# Patient Record
Sex: MALE | Race: BLACK OR AFRICAN AMERICAN | NOT HISPANIC OR LATINO | URBAN - METROPOLITAN AREA
[De-identification: names, ages, dates, MRNs, and addresses within clinical notes are randomized per-mention and may not be internally consistent; named-entity substitution may affect disease eponyms.]

---

## 2020-05-27 DIAGNOSIS — Z85.46 HISTORY OF PROSTATE CANCER: Primary | ICD-10-CM

## 2020-06-01 ENCOUNTER — OFFICE VISIT (OUTPATIENT)
Dept: UROLOGY | Facility: CLINIC | Age: 60
End: 2020-06-01
Payer: COMMERCIAL

## 2020-06-01 VITALS
HEART RATE: 82 BPM | HEIGHT: 70 IN | RESPIRATION RATE: 18 BRPM | SYSTOLIC BLOOD PRESSURE: 124 MMHG | BODY MASS INDEX: 26.48 KG/M2 | DIASTOLIC BLOOD PRESSURE: 78 MMHG | WEIGHT: 185 LBS

## 2020-06-01 DIAGNOSIS — N39.3 STRESS INCONTINENCE, MALE: ICD-10-CM

## 2020-06-01 DIAGNOSIS — N52.31 ERECTILE DYSFUNCTION AFTER RADICAL PROSTATECTOMY: Primary | ICD-10-CM

## 2020-06-01 DIAGNOSIS — C61 PROSTATE CANCER: ICD-10-CM

## 2020-06-01 DIAGNOSIS — Z85.46 HISTORY OF PROSTATE CANCER: ICD-10-CM

## 2020-06-01 LAB — PSA, DIAGNOSTIC: <0.014 NG/ML (ref 0–4)

## 2020-06-01 PROCEDURE — 99204 PR OFFICE/OUTPT VISIT, NEW, LEVL IV, 45-59 MIN: ICD-10-PCS | Mod: S$GLB,,, | Performed by: SPECIALIST

## 2020-06-01 PROCEDURE — 3008F PR BODY MASS INDEX (BMI) DOCUMENTED: ICD-10-PCS | Mod: CPTII,S$GLB,, | Performed by: SPECIALIST

## 2020-06-01 PROCEDURE — 3008F BODY MASS INDEX DOCD: CPT | Mod: CPTII,S$GLB,, | Performed by: SPECIALIST

## 2020-06-01 PROCEDURE — 99204 OFFICE O/P NEW MOD 45 MIN: CPT | Mod: S$GLB,,, | Performed by: SPECIALIST

## 2020-06-01 RX ORDER — BENAZEPRIL HYDROCHLORIDE 40 MG/1
TABLET ORAL
COMMUNITY
Start: 2020-04-15

## 2020-06-01 RX ORDER — ATORVASTATIN CALCIUM 40 MG/1
TABLET, FILM COATED ORAL
COMMUNITY
Start: 2020-03-03

## 2020-06-01 RX ORDER — ASPIRIN 81 MG/1
81 TABLET ORAL DAILY
COMMUNITY

## 2020-06-01 RX ORDER — AMOXICILLIN 500 MG
CAPSULE ORAL DAILY
COMMUNITY

## 2020-06-01 RX ORDER — PANTOPRAZOLE SODIUM 40 MG/1
TABLET, DELAYED RELEASE ORAL
COMMUNITY
Start: 2020-05-01

## 2020-06-01 RX ORDER — DULAGLUTIDE 1.5 MG/.5ML
INJECTION, SOLUTION SUBCUTANEOUS
COMMUNITY
Start: 2020-05-24

## 2020-06-01 RX ORDER — DAPAGLIFLOZIN AND METFORMIN HYDROCHLORIDE 5; 1000 MG/1; MG/1
TABLET, FILM COATED, EXTENDED RELEASE ORAL
COMMUNITY
Start: 2020-04-15

## 2020-06-01 NOTE — PROGRESS NOTES
Subjective:       Patient ID: Gabriele Orr is a 60 y.o. male.    Chief Complaint: Other (New pt. referred by Dr. Archer to eval and treat; hx of prostate cancer)      HPI:  60-year-old man who was presented to me for management of prostate cancer.  He was diagnosed with prostate cancer in [facility] and treated with a open radical retropubic prostatectomy performed by Dr. Carlito Weston at [facility] in [facility].  Patient is now moved back to [facility] and he is presenting today to establish urological care.    He reports stress incontinence which is worse with exertion coughing and sneezing.  He is down now to about 2 pads per day.  That is at least manageable to him.    He reports postprostatectomy erectile dysfunction.  Before he left [place] he also on intracavernosal penile injections.  He was using about 40 units (0.4 cc) and that seemed to work for him.  He reports that it will give him an erection for about 3-4 hours.  Prior to leaving [place] he was talking to his urologist over there about a penile prosthesis.  This was and I did with beginning to consider.    He has not had a PSA checked in over a year.    Past Medical History:   Past Medical History:   Diagnosis Date    Diabetes mellitus     ED (erectile dysfunction)     History of prostate cancer 2014    Hyperlipidemia     Hypertension        Past Surgical Historical:   Past Surgical History:   Procedure Laterality Date    KNEE SURGERY Left 1995    PROSTATECTOMY  2014        Medications:   Medication List with Changes/Refills   Current Medications    ASPIRIN (ECOTRIN) 81 MG EC TABLET    Take 81 mg by mouth once daily.    ATORVASTATIN (LIPITOR) 40 MG TABLET        BENAZEPRIL (LOTENSIN) 40 MG TABLET        OMEGA-3 FATTY ACIDS/FISH OIL (FISH OIL-OMEGA-3 FATTY ACIDS) 300-1,000 MG CAPSULE    Take by mouth once daily.    PANTOPRAZOLE (PROTONIX) 40 MG TABLET        TRULICITY 1.5 MG/0.5 ML PNIJ        XIGDUO XR 5-1,000 MG TBPH             Past Social History:   Social History     Socioeconomic History    Marital status:      Spouse name: Not on file    Number of children: Not on file    Years of education: Not on file    Highest education level: Not on file   Occupational History    Not on file   Social Needs    Financial resource strain: Not on file    Food insecurity:     Worry: Not on file     Inability: Not on file    Transportation needs:     Medical: Not on file     Non-medical: Not on file   Tobacco Use    Smoking status: Never Smoker    Smokeless tobacco: Never Used   Substance and Sexual Activity    Alcohol use: Not on file    Drug use: Not on file    Sexual activity: Not on file   Lifestyle    Physical activity:     Days per week: Not on file     Minutes per session: Not on file    Stress: Not on file   Relationships    Social connections:     Talks on phone: Not on file     Gets together: Not on file     Attends [employer] service: Not on file     Active member of club or organization: Not on file     Attends meetings of clubs or organizations: Not on file     Relationship status: Not on file   Other Topics Concern    Not on file   Social History Narrative    Not on file       Allergies: Review of patient's allergies indicates:  No Known Allergies     Family History:   Family History   Problem Relation Age of Onset    Cancer Mother     Prostate cancer Paternal Uncle     Prostate cancer Maternal Grandfather     Breast cancer Sister     Diabetes Sister     Hypertension Sister         Review of Systems:  Review of Systems - General ROS: negative  Psychological ROS: negative  Ophthalmic ROS: negative  ENT ROS: negative  Allergy and Immunology ROS: negative  Hematological and Lymphatic ROS: negative  Endocrine ROS: negative  Respiratory ROS: no cough, shortness of breath, or wheezing  Cardiovascular ROS: no chest pain or dyspnea on exertion  Gastrointestinal ROS: no abdominal pain, change in bowel habits, or  black or bloody stools  Genito-Urinary ROS: positive for - prostate cancer with post prostatectomy treatment related side effects  Musculoskeletal ROS: negative  Neurological ROS: no TIA or stroke symptoms  Dermatological ROS: negative     Physical Exam:  General Appearance:    Alert, cooperative, no distress, appears stated age   Head:    Normocephalic, without obvious abnormality, atraumatic   Eyes:    PERRL, conjunctiva/corneas clear, EOM's intact, fundi     benign, both eyes        Ears:    Normal TM's and external ear canals, both ears   Nose:   Nares normal, septum midline, mucosa normal, no drainage    or sinus tenderness   Throat:   Lips, mucosa, and tongue normal; teeth and gums normal   Neck:   Supple, symmetrical, trachea midline, no adenopathy;        thyroid:  No enlargement/tenderness/nodules; no carotid    bruit or JVD   Back:     Symmetric, no curvature, ROM normal, no CVA tenderness   Lungs:     Clear to auscultation bilaterally, respirations unlabored   Chest wall:    No tenderness or deformity   Heart:    Regular rate and rhythm, S1 and S2 normal, no murmur, rub   or gallop   Abdomen:     Soft, non-tender, bowel sounds active all four quadrants,     no masses, no organomegaly   Genitalia:    Deferred   Rectal:    Deferred   Extremities:   Extremities normal, atraumatic, no cyanosis or edema   Pulses:   2+ and symmetric all extremities   Skin:   Skin color, texture, turgor normal, no rashes or lesions   Lymph nodes:   Cervical, supraclavicular, and axillary nodes normal   Neurologic:   CNII-XII intact. Normal strength, sensation and reflexes       throughout         Assessment/Plan:       60-year-old man with history of prostate cancer treated with open retropubic prostatectomy in 2014 now has treatment related side effects.    1.  Patient is satisfied with his incontinence.  We should observe for now  2.  He is mostly concerned about his sexual life.  For now he will continue to [employer]  injections.  We have renewed a prescription for him today and advised him to start 0.3 cc intracavernosally per session  3.  I gave her some handouts today on penile prosthesis.  He will reviewed this with his partner.  4.  Return to clinic in 4 months.    Problem List Items Addressed This Visit     None      Visit Diagnoses     Erectile dysfunction after radical prostatectomy    -  Primary    History of prostate cancer        Prostate cancer        Stress incontinence, male

## 2020-06-01 NOTE — LETTER
June 1, 2020      Ephraim Archer MD  [address] [place] [address]           [facility]  [address] [address]  Phone: [phone]  Fax: [phone]          Patient: Gabriele Orr   MR Number: [id number]   YOB: 1960   Date of Visit: 6/1/2020       Dear Dr. Ephraim Archer:    Thank you for referring Gabriele Orr to me for evaluation. Attached you will find relevant portions of my assessment and plan of care.    If you have questions, please do not hesitate to call me. I look forward to following Gabriele Orr along with you.    Sincerely,    Masoud Hamm MD    Enclosure  CC:  No Recipients    If you would like to receive this communication electronically, please contact [email or web address] or [phone] to request more information on [employer] access.    For providers and/or their staff who would like to refer a patient to [facility], please contact us through our one-stop-shop provider referral line, [facility], at [phone].    If you feel you have received this communication in error or would no longer like to receive these types of communications, please e-mail [email or web address]

## 2020-06-02 ENCOUNTER — TELEPHONE (OUTPATIENT)
Dept: UROLOGY | Facility: CLINIC | Age: 60
End: 2020-06-02

## 2020-06-02 NOTE — TELEPHONE ENCOUNTER
Pt contacted and informed of PSA result. Pt verbalized understanding.     ----- Message from Masoud Hamm MD sent at 6/1/2020  7:02 PM CDT -----  PSA undetectable inform him.